# Patient Record
Sex: FEMALE | Race: ASIAN | NOT HISPANIC OR LATINO | Employment: FULL TIME | URBAN - METROPOLITAN AREA
[De-identification: names, ages, dates, MRNs, and addresses within clinical notes are randomized per-mention and may not be internally consistent; named-entity substitution may affect disease eponyms.]

---

## 2022-11-10 VITALS
WEIGHT: 127.65 LBS | DIASTOLIC BLOOD PRESSURE: 102 MMHG | TEMPERATURE: 98.2 F | HEART RATE: 107 BPM | SYSTOLIC BLOOD PRESSURE: 144 MMHG | OXYGEN SATURATION: 98 % | RESPIRATION RATE: 16 BRPM

## 2022-11-11 ENCOUNTER — HOSPITAL ENCOUNTER (EMERGENCY)
Facility: CLINIC | Age: 47
Discharge: LEFT BEFORE DISCHARGE | End: 2022-11-11
Payer: COMMERCIAL

## 2022-11-11 VITALS
DIASTOLIC BLOOD PRESSURE: 90 MMHG | OXYGEN SATURATION: 99 % | TEMPERATURE: 97.8 F | HEART RATE: 105 BPM | RESPIRATION RATE: 18 BRPM | SYSTOLIC BLOOD PRESSURE: 124 MMHG

## 2022-11-11 NOTE — ED TRIAGE NOTES
Patient has a rectal abscess that she noted yesterday. Patient was here last night but ended up going home because the wait was to long, patient went to urgent care this morning and was told to come to the ED.      Triage Assessment     Row Name 11/11/22 1227       Triage Assessment (Adult)    Airway WDL WDL       Respiratory WDL    Respiratory WDL WDL       Skin Circulation/Temperature WDL    Skin Circulation/Temperature WDL WDL       Cardiac WDL    Cardiac WDL WDL       Peripheral/Neurovascular WDL    Peripheral Neurovascular WDL WDL       Cognitive/Neuro/Behavioral WDL    Cognitive/Neuro/Behavioral WDL WDL

## 2022-11-11 NOTE — ED TRIAGE NOTES
Has hemmorroid surgery scheduled 12/27. Now having rectal abscess. Pt having chills today. Pt tearful in triage.

## 2024-12-10 ENCOUNTER — TELEPHONE (OUTPATIENT)
Dept: FAMILY MEDICINE | Facility: CLINIC | Age: 49
End: 2024-12-10
Payer: COMMERCIAL

## 2024-12-10 ENCOUNTER — HOSPITAL ENCOUNTER (OUTPATIENT)
Facility: CLINIC | Age: 49
Setting detail: OBSERVATION
Discharge: HOME | End: 2024-12-11
Attending: INTERNAL MEDICINE | Admitting: STUDENT IN AN ORGANIZED HEALTH CARE EDUCATION/TRAINING PROGRAM
Payer: COMMERCIAL

## 2024-12-10 DIAGNOSIS — K61.0 PERIANAL ABSCESS: ICD-10-CM

## 2024-12-10 DIAGNOSIS — K61.39 ISCHIORECTAL ABSCESS: Primary | ICD-10-CM

## 2024-12-10 LAB
EST. AVERAGE GLUCOSE BLD GHB EST-MCNC: 151 MG/DL
GLUCOSE BLDC GLUCOMTR-MCNC: 115 MG/DL (ref 70–99)
HBA1C MFR BLD: 6.9 %

## 2024-12-10 PROCEDURE — 83036 HEMOGLOBIN GLYCOSYLATED A1C: CPT | Performed by: INTERNAL MEDICINE

## 2024-12-10 PROCEDURE — 36415 COLL VENOUS BLD VENIPUNCTURE: CPT | Performed by: INTERNAL MEDICINE

## 2024-12-10 PROCEDURE — 99222 1ST HOSP IP/OBS MODERATE 55: CPT | Performed by: INTERNAL MEDICINE

## 2024-12-10 PROCEDURE — 96374 THER/PROPH/DIAG INJ IV PUSH: CPT

## 2024-12-10 PROCEDURE — 120N000001 HC R&B MED SURG/OB

## 2024-12-10 PROCEDURE — 250N000011 HC RX IP 250 OP 636: Performed by: INTERNAL MEDICINE

## 2024-12-10 PROCEDURE — G0379 DIRECT REFER HOSPITAL OBSERV: HCPCS

## 2024-12-10 PROCEDURE — 96375 TX/PRO/DX INJ NEW DRUG ADDON: CPT

## 2024-12-10 PROCEDURE — 250N000013 HC RX MED GY IP 250 OP 250 PS 637: Performed by: INTERNAL MEDICINE

## 2024-12-10 RX ORDER — HYDROMORPHONE HCL IN WATER/PF 6 MG/30 ML
0.2 PATIENT CONTROLLED ANALGESIA SYRINGE INTRAVENOUS
Status: DISCONTINUED | OUTPATIENT
Start: 2024-12-10 | End: 2024-12-11

## 2024-12-10 RX ORDER — NALOXONE HYDROCHLORIDE 0.4 MG/ML
0.4 INJECTION, SOLUTION INTRAMUSCULAR; INTRAVENOUS; SUBCUTANEOUS
Status: DISCONTINUED | OUTPATIENT
Start: 2024-12-10 | End: 2024-12-11 | Stop reason: HOSPADM

## 2024-12-10 RX ORDER — NALOXONE HYDROCHLORIDE 0.4 MG/ML
0.2 INJECTION, SOLUTION INTRAMUSCULAR; INTRAVENOUS; SUBCUTANEOUS
Status: DISCONTINUED | OUTPATIENT
Start: 2024-12-10 | End: 2024-12-11 | Stop reason: HOSPADM

## 2024-12-10 RX ORDER — PIPERACILLIN SODIUM, TAZOBACTAM SODIUM 3; .375 G/15ML; G/15ML
3.38 INJECTION, POWDER, LYOPHILIZED, FOR SOLUTION INTRAVENOUS EVERY 6 HOURS
Status: DISCONTINUED | OUTPATIENT
Start: 2024-12-10 | End: 2024-12-11

## 2024-12-10 RX ORDER — LIDOCAINE 40 MG/G
CREAM TOPICAL
Status: DISCONTINUED | OUTPATIENT
Start: 2024-12-10 | End: 2024-12-11 | Stop reason: HOSPADM

## 2024-12-10 RX ORDER — HYDROMORPHONE HCL IN WATER/PF 6 MG/30 ML
0.4 PATIENT CONTROLLED ANALGESIA SYRINGE INTRAVENOUS
Status: DISCONTINUED | OUTPATIENT
Start: 2024-12-10 | End: 2024-12-11

## 2024-12-10 RX ORDER — PROCHLORPERAZINE MALEATE 5 MG/1
10 TABLET ORAL EVERY 6 HOURS PRN
Status: DISCONTINUED | OUTPATIENT
Start: 2024-12-10 | End: 2024-12-11 | Stop reason: HOSPADM

## 2024-12-10 RX ORDER — AMOXICILLIN 250 MG
1 CAPSULE ORAL 2 TIMES DAILY PRN
Status: DISCONTINUED | OUTPATIENT
Start: 2024-12-10 | End: 2024-12-11 | Stop reason: HOSPADM

## 2024-12-10 RX ORDER — OXYCODONE HYDROCHLORIDE 5 MG/1
5 TABLET ORAL EVERY 4 HOURS PRN
Status: DISCONTINUED | OUTPATIENT
Start: 2024-12-10 | End: 2024-12-11 | Stop reason: HOSPADM

## 2024-12-10 RX ORDER — ACETAMINOPHEN 325 MG/1
650 TABLET ORAL EVERY 4 HOURS PRN
Status: DISCONTINUED | OUTPATIENT
Start: 2024-12-10 | End: 2024-12-11 | Stop reason: HOSPADM

## 2024-12-10 RX ORDER — NICOTINE POLACRILEX 4 MG
15-30 LOZENGE BUCCAL
Status: DISCONTINUED | OUTPATIENT
Start: 2024-12-10 | End: 2024-12-11 | Stop reason: HOSPADM

## 2024-12-10 RX ORDER — ACETAMINOPHEN 650 MG/1
650 SUPPOSITORY RECTAL EVERY 4 HOURS PRN
Status: DISCONTINUED | OUTPATIENT
Start: 2024-12-10 | End: 2024-12-11 | Stop reason: HOSPADM

## 2024-12-10 RX ORDER — ONDANSETRON 4 MG/1
4 TABLET, ORALLY DISINTEGRATING ORAL EVERY 6 HOURS PRN
Status: DISCONTINUED | OUTPATIENT
Start: 2024-12-10 | End: 2024-12-11 | Stop reason: HOSPADM

## 2024-12-10 RX ORDER — DEXTROSE MONOHYDRATE 25 G/50ML
25-50 INJECTION, SOLUTION INTRAVENOUS
Status: DISCONTINUED | OUTPATIENT
Start: 2024-12-10 | End: 2024-12-11 | Stop reason: HOSPADM

## 2024-12-10 RX ORDER — METFORMIN HYDROCHLORIDE 500 MG/1
500 TABLET, EXTENDED RELEASE ORAL
COMMUNITY

## 2024-12-10 RX ORDER — HYDROMORPHONE HYDROCHLORIDE 1 MG/ML
0.5 INJECTION, SOLUTION INTRAMUSCULAR; INTRAVENOUS; SUBCUTANEOUS ONCE
Status: COMPLETED | OUTPATIENT
Start: 2024-12-10 | End: 2024-12-10

## 2024-12-10 RX ORDER — HYDROXYZINE HYDROCHLORIDE 25 MG/1
25 TABLET, FILM COATED ORAL EVERY 4 HOURS PRN
Status: DISCONTINUED | OUTPATIENT
Start: 2024-12-10 | End: 2024-12-11 | Stop reason: HOSPADM

## 2024-12-10 RX ORDER — AMOXICILLIN 250 MG
2 CAPSULE ORAL 2 TIMES DAILY PRN
Status: DISCONTINUED | OUTPATIENT
Start: 2024-12-10 | End: 2024-12-11 | Stop reason: HOSPADM

## 2024-12-10 RX ORDER — ATORVASTATIN CALCIUM 20 MG/1
20 TABLET, FILM COATED ORAL EVERY EVENING
COMMUNITY

## 2024-12-10 RX ORDER — ONDANSETRON 2 MG/ML
4 INJECTION INTRAMUSCULAR; INTRAVENOUS EVERY 6 HOURS PRN
Status: DISCONTINUED | OUTPATIENT
Start: 2024-12-10 | End: 2024-12-11 | Stop reason: HOSPADM

## 2024-12-10 RX ORDER — CALCIUM CARBONATE 500 MG/1
1000 TABLET, CHEWABLE ORAL 4 TIMES DAILY PRN
Status: DISCONTINUED | OUTPATIENT
Start: 2024-12-10 | End: 2024-12-11 | Stop reason: HOSPADM

## 2024-12-10 RX ADMIN — HYDROMORPHONE HYDROCHLORIDE 0.5 MG: 1 INJECTION, SOLUTION INTRAMUSCULAR; INTRAVENOUS; SUBCUTANEOUS at 20:50

## 2024-12-10 RX ADMIN — HYDROXYZINE HYDROCHLORIDE 25 MG: 25 TABLET, FILM COATED ORAL at 23:03

## 2024-12-10 RX ADMIN — ACETAMINOPHEN 650 MG: 325 TABLET, FILM COATED ORAL at 23:03

## 2024-12-10 RX ADMIN — PIPERACILLIN AND TAZOBACTAM 3.38 G: 3; .375 INJECTION, POWDER, FOR SOLUTION INTRAVENOUS at 22:24

## 2024-12-10 ASSESSMENT — ACTIVITIES OF DAILY LIVING (ADL)
ADLS_ACUITY_SCORE: 17
ADLS_ACUITY_SCORE: 15
ADLS_ACUITY_SCORE: 15
ADLS_ACUITY_SCORE: 41
ADLS_ACUITY_SCORE: 15

## 2024-12-10 NOTE — TELEPHONE ENCOUNTER
Clinic: [facility] urgency room  Dx: Perianal rectal abscess  Provider: Dr. Meka Whittaker  Bed request: Med Surgery    Presented to urgent care with perianal abscess that started yesterday. CT scan showed 2 cm abscess with likely fistula.     Tachy at low 100s, afebrile, 115/70    WBC 12, lactic acid normal    Was given Unasyn, Morphine 4 mg and Dilaudid 0.5 mg.     Did not obtain blood cultures.

## 2024-12-11 VITALS
DIASTOLIC BLOOD PRESSURE: 60 MMHG | HEART RATE: 75 BPM | WEIGHT: 131 LBS | SYSTOLIC BLOOD PRESSURE: 109 MMHG | TEMPERATURE: 98.5 F | BODY MASS INDEX: 21.83 KG/M2 | HEIGHT: 65 IN | RESPIRATION RATE: 20 BRPM | OXYGEN SATURATION: 99 %

## 2024-12-11 PROBLEM — K61.39 ISCHIORECTAL ABSCESS: Status: ACTIVE | Noted: 2024-12-11

## 2024-12-11 LAB
ANION GAP SERPL CALCULATED.3IONS-SCNC: 14 MMOL/L (ref 7–15)
BUN SERPL-MCNC: 10.4 MG/DL (ref 6–20)
CALCIUM SERPL-MCNC: 8.8 MG/DL (ref 8.8–10.4)
CHLORIDE SERPL-SCNC: 106 MMOL/L (ref 98–107)
CREAT SERPL-MCNC: 0.78 MG/DL (ref 0.51–0.95)
EGFRCR SERPLBLD CKD-EPI 2021: >90 ML/MIN/1.73M2
ERYTHROCYTE [DISTWIDTH] IN BLOOD BY AUTOMATED COUNT: 13.4 % (ref 10–15)
GLUCOSE BLDC GLUCOMTR-MCNC: 106 MG/DL (ref 70–99)
GLUCOSE BLDC GLUCOMTR-MCNC: 107 MG/DL (ref 70–99)
GLUCOSE BLDC GLUCOMTR-MCNC: 135 MG/DL (ref 70–99)
GLUCOSE BLDC GLUCOMTR-MCNC: 192 MG/DL (ref 70–99)
GLUCOSE SERPL-MCNC: 106 MG/DL (ref 70–99)
HCO3 SERPL-SCNC: 22 MMOL/L (ref 22–29)
HCT VFR BLD AUTO: 33 % (ref 35–47)
HGB BLD-MCNC: 11 G/DL (ref 11.7–15.7)
MCH RBC QN AUTO: 29.2 PG (ref 26.5–33)
MCHC RBC AUTO-ENTMCNC: 33.3 G/DL (ref 31.5–36.5)
MCV RBC AUTO: 88 FL (ref 78–100)
PLATELET # BLD AUTO: 226 10E3/UL (ref 150–450)
POTASSIUM SERPL-SCNC: 3.8 MMOL/L (ref 3.4–5.3)
RBC # BLD AUTO: 3.77 10E6/UL (ref 3.8–5.2)
SODIUM SERPL-SCNC: 142 MMOL/L (ref 135–145)
WBC # BLD AUTO: 9.6 10E3/UL (ref 4–11)

## 2024-12-11 PROCEDURE — 250N000011 HC RX IP 250 OP 636: Performed by: INTERNAL MEDICINE

## 2024-12-11 PROCEDURE — 250N000013 HC RX MED GY IP 250 OP 250 PS 637: Performed by: STUDENT IN AN ORGANIZED HEALTH CARE EDUCATION/TRAINING PROGRAM

## 2024-12-11 PROCEDURE — 80048 BASIC METABOLIC PNL TOTAL CA: CPT | Performed by: INTERNAL MEDICINE

## 2024-12-11 PROCEDURE — 85014 HEMATOCRIT: CPT | Performed by: INTERNAL MEDICINE

## 2024-12-11 PROCEDURE — 36415 COLL VENOUS BLD VENIPUNCTURE: CPT | Performed by: INTERNAL MEDICINE

## 2024-12-11 PROCEDURE — 96376 TX/PRO/DX INJ SAME DRUG ADON: CPT

## 2024-12-11 PROCEDURE — 250N000012 HC RX MED GY IP 250 OP 636 PS 637: Performed by: INTERNAL MEDICINE

## 2024-12-11 PROCEDURE — 99203 OFFICE O/P NEW LOW 30 MIN: CPT | Performed by: PHYSICIAN ASSISTANT

## 2024-12-11 RX ORDER — OXYCODONE HYDROCHLORIDE 5 MG/1
2.5-5 TABLET ORAL EVERY 4 HOURS PRN
Qty: 6 TABLET | Refills: 0 | Status: SHIPPED | OUTPATIENT
Start: 2024-12-11

## 2024-12-11 RX ADMIN — AMOXICILLIN AND CLAVULANATE POTASSIUM 1 TABLET: 875; 125 TABLET, FILM COATED ORAL at 11:02

## 2024-12-11 RX ADMIN — INSULIN ASPART 2 UNITS: 100 INJECTION, SOLUTION INTRAVENOUS; SUBCUTANEOUS at 00:29

## 2024-12-11 RX ADMIN — PIPERACILLIN AND TAZOBACTAM 3.38 G: 3; .375 INJECTION, POWDER, FOR SOLUTION INTRAVENOUS at 04:12

## 2024-12-11 ASSESSMENT — ACTIVITIES OF DAILY LIVING (ADL)
ADLS_ACUITY_SCORE: 21
ADLS_ACUITY_SCORE: 21
ADLS_ACUITY_SCORE: 17
ADLS_ACUITY_SCORE: 21
ADLS_ACUITY_SCORE: 17
ADLS_ACUITY_SCORE: 21

## 2024-12-11 NOTE — PLAN OF CARE
Discharge Note    Patient discharged to home via private vehicle  accompanied by significant other .  IV: Discontinued  Prescriptions printed and given to patient/family. Pain meds  Belongings reviewed and sent with patient and family.   Home medications returned to patient: NA  Equipment sent with: patient, N/A.   patient and family verbalizes understanding of discharge instructions. AVS given to patient and family.  Additional education completed?  Follow-up appts and care of site of abscess., discharge instructions.  Pt is discharged to home.  Pt is a/o, denies pain, VSS.  Discharge instructions explained to pt and verbally acknowledged.  All valuables with pt at time of discharge.  Pt left with family via automobile.    Lisa Childress RN

## 2024-12-11 NOTE — CONSULTS
[facility]  Colon and Rectal Surgery Consult Note  Name: Essie Gould    MRN: [id number]  YOB: 1975    Age: 49 year old  Date of admission: 12/10/2024  Primary care provider: No Ref-Primary, Physician     Requesting Physician:  Dr. Avalos  Reason for consult:  Perianal abscess            History of Present Illness:   Essie Gould is a 49 year old female with PMH including DM type II, hyperlipidemia, hemorrhoids, and previous perianal abscess, seen at the request of Dr. Avalos, who was directly admitted from the [facility] for a perianal abscess. Patient reports developing anal pain yesterday with associated chills and nausea. Denies any fevers. She presented to the [facility] and a CT pelvis was obtained, which revealed a 2.1 x 1.0 cm perianal abscess with probable fistula to the anus. She had a mild leukocytosis of 12.1, lactate normal. She received a dose of Unasyn at the urgent care. Patient was directly admitted to [facility] for further management. She was started on IV Zosyn and has been NPO since midnight.    Today, patient is resting comfortably in bed. BP is 109/60, HR 75, temperature 98.5 degrees F, and SpO2 is 99% on RA. WBC has improved to 9.6. Patient reports the site spontaneously drained a large amount of brown discharge overnight. Her pain is subsequently much improved. Denies any fevers, blood in her stools, or abdominal pain. Her last BM was yesterday and was formed without blood. She typically has a soft and formed stool once per day. Patient notes a previous abscess about 2 years ago. This spontaneously drained and she has had no issues since then. Of note, patient reports she was scheduled for a hemorrhoidectomy and intraoperative colonoscopy around that time, but she cancelled this due to her abscess. Patient denies any known family history of colorectal cancer, ulcerative colitis, or Crohn's disease.    Patient mentions that she is going on a trip to [facility] at  "the end of next week.    Colonoscopy History:  3/29/2010: normal except internal and external hemorrhoids              Past Medical History:   No past medical history on file.          Past Surgical History:   No past surgical history on file.            Social History:     Social History     Tobacco Use    Smoking status: Never    Smokeless tobacco: Not on file   Substance Use Topics    Alcohol use: Not on file             Family History:   No family history on file.          Allergies:     Allergies   Allergen Reactions    Shellfish-Derived Products Hives             Medications:     Current Facility-Administered Medications   Medication Dose Route Frequency Provider Last Rate Last Admin    insulin aspart (NovoLOG) injection (RAPID ACTING)  1-7 Units Subcutaneous Q4H Anthony Avalos MD   2 Units at 12/11/24 0029    piperacillin-tazobactam (ZOSYN) 3.375 g vial to attach to  mL bag  3.375 g Intravenous Q6H Anthony Avalos MD   3.375 g at 12/11/24 0412    sodium chloride (PF) 0.9% PF flush 3 mL  3 mL Intracatheter Q8H Anthony Avalos MD   3 mL at 12/11/24 0414             Review of Systems:   A comprehensive greater than 10 system review of systems was carried out.  Pertinent positives and negatives are noted above.  Otherwise negative for contributory info.            Physical Exam:     Blood pressure 109/60, pulse 75, temperature 98.5  F (36.9  C), temperature source Oral, resp. rate 20, height 1.651 m (5' 5\"), weight 59.4 kg (131 lb), SpO2 99%.  No intake or output data in the 24 hours ending 12/11/24 0934  EXAM:  GEN: Awake alert and oriented, appears stated age  PULM: Non-labored breathing with normal respiratory effort  RECTAL: Chaperone present. Rectal exam with a soft hemorrhoidal skin tag anteriorly and an inflamed, non-thrombosed external hemorrhoid posteriorly. Posterior to the inflamed hemorrhoid, there is an area of induration and tenderness with a small opening and dried " blood. Gentle pressure was applied without any further drainage of pus. No palpable fluctuance noted. ALIVIA without any palpable masses or fluctuance. No blood on gloved finger.  PSYCH: responsive, alert, cooperative; oriented x3; appropriate mood and affect  EXT/SKIN: inspection reveals no rashes, lesions or ulcers, normal coloring         Data Reviewed:     Results for orders placed or performed in visit on 06/04/09   ECHO HEART XTHORACIC,COMPLETE, W/O DOPPLER     Value    XCELERA         Interpretation Summary  The study was technically adequate.  There is no comparison study available.  Normal transthoracic echocardiogram.  The visual ejection fraction is estimated at 55-60%.  PatientHeight: 65 in  PatientWeight: 120 lbs  HeartRate: 67 bpm  BSA 1.6 m^2        Left Ventricle  The left ventricle is normal in structure, function and size.  The visual ejection fraction is estimated at 55-60%.  Normal left ventricular diastolic function.  Normal left ventricular wall motion.     Right Ventricle  The right ventricle is normal in structure, function and size.     Atria  Normal left atrial size.  Right atrial size is normal.  There is no atrial shunt seen.     Mitral Valve  The mitral valve is normal in structure and function.  There is trace mitral regurgitation.     Tricuspid Valve  The tricuspid valve is normal in structure and function.  There is trace tricuspid regurgitation.  The right ventricular systolic pressure is approximated at 13 mmHg plus the   right atrial pressure.  Small IVC (<1.5cm) with normal respiratory collapse; right atrial pressure is   estimated at 0-5mmHg.     Aortic Valve  The aortic valve is normal in structure and function.  No aortic regurgitation is present.     Pulmonic Valve  Normal pulmonic valve.  There is trace pulmonic valvular regurgitation.     Vessels  Normal size aorta.     Pericardium  There is no pericardial effusion.     Rhythm  The rhythm was normal sinus.    "  Procedure  Complete Echo Adult.     MMode 2D Measurements & Calculations  IVSd: 0.77 cm  IVSs: 1.3 cm  LVIDd: 3.7 cm  LVIDs: 2.5 cm  LVPWd: 0.77 cm  LVPWs: 1.2 cm  FS: 33 %  LV mass(C)d: 78 grams  EPSS: 0.72 cm  Ao root diam: 2.4 cm  LA dimension: 2.5 cm  LA/Ao: 1.0   Doppler Measurements & Calculations  MV E point: 89 cm/sec  MV A point: 53 cm/sec  MV E/A: 1.7   MV dec time: 0.22 sec  TR Max P mmHg     Interpreting Physician:  Rogelio Garcia MD electronically signed on 2009   08:57:54       Recent Labs   Lab 24  0804   WBC 9.6   HGB 11.0*   HCT 33.0*   MCV 88        Recent Labs   Lab 24  0804 24  0744 24  0414     --   --    POTASSIUM 3.8  --   --    CHLORIDE 106  --   --    CO2 22  --   --    ANIONGAP 14  --   --    * 107* 106*   BUN 10.4  --   --    CR 0.78  --   --    GFRESTIMATED >90  --   --    HELEN 8.8  --   --      GFR Estimate   Date Value Ref Range Status   2024 >90 >60 mL/min/1.73m2 Final     Comment:     eGFR calculated using  CKD-EPI equation.   2009 >90 >60 mL/min/1.7m2 Final     GFR Estimate If Black   Date Value Ref Range Status   2009 >90 >60 mL/min/1.7m2 Final     Recent Labs   Lab 24  0804   HGB 11.0*     No results for input(s): \"AST\", \"ALT\", \"GGT\", \"ALKPHOS\", \"BILITOTAL\", \"BILICONJ\", \"BILIDIRECT\", \"GILDA\" in the last 168 hours.    Invalid input(s): \"BILIRUBININDIRECT\"  No results for input(s): \"INR\" in the last 168 hours.  No results for input(s): \"LACT\" in the last 168 hours.      Assessment and Plan:   Essie Gould is a 49 year old female with PMH including DM type II, hyperlipidemia, hemorrhoids, and previous perianal abscess, seen at the request of Dr. Avalos, who was directly admitted from the [facility] for a perianal abscess. CT pelvis was performed at Urgent Care, which demonstrated a 2.1 x 1.0 cm perianal abscess with probable fistula to the anus. She had a mild leukocytosis of 12.1 which is now " normalized this morning. Patient is afebrile and all other vital signs are within normal limits. Patient describes a large amount of brown pus that drained early this morning prior to exam. Rectal exam today reveals a soft hemorrhoidal skin tag anteriorly and an inflamed, non-thrombosed external hemorrhoid posteriorly. Posterior to the inflamed hemorrhoid, there is a small opening without any surrounding fluctuance noted. Gentle pressure was applied without any further drainage of pus. Patient denies any further pain when resting in bed, but the area is  to palpation. Discussed perianal abscesses and fistulas. Explained the possible treatment options including bedside drainage versus an exam under anesthesia with drainage and possible seton placement. If a seton was placed, this would likely require an additional surgery to remove the seton and perform a fistulotomy versus a ligation of the fistula tract. We also discussed holding off on any procedures at this time as she has had significant drainage and her symptoms have improved. Reviewed that with the presence of a fistula, there is a risk of developing a recurrent abscess. Patient wished to hold off on surgery at this time and follow up in clinic to discuss elective surgery. Our office will reach out to her to schedule a wound check early next week given that she is planning on leaving for an international trip at the end of next week.     As no urgent surgery is planned during this hospitalization, okay for patient to have a regular diet. Patient is at higher risk due to her diabetes and should receive a total of 7-10 days of antibiotics. Patient should continue to keep the area clean and dry. Recommend warm water soaks 1-2 times per day to promote drainage and to provide relief. She can keep a dry piece of gauze at the site to absorb drainage. If she has increased pain, swelling, redness, warmth, fevers, or chills, patient should present to the ED  or call our office for immediate evaluation. Patient verbalized understanding and agreed with the plan.      Plan:  Admit to hospitalist  Surgery: No emergent surgery indicated.  Diet: Okay for regular diet  IV Fluids: per hospitalist  Antibiotics:  IV Zosyn, transition to oral Augmentin or Cipro/Flagyl on discharge  Medications: Continue home meds per hospitalist  I&O s:  strict I&O s  Labs:   - Reviewed: by myself and Dr. Bland  - Ordered:   Imaging:   - CT pelvis report was reviewed. Images unavailable and need to be uploaded to  PACS.  Activity: ambulate as tolerated, encourage OOB  DVT prophylaxis: SCD s  This plan has been discussed with Dr. Bland    Patient specific identified risk factors considered as part of today s evaluation include: previous perianal abscess, T2DM    Additional history obtained from chart review and patient.  Time spent on consultation including non face-to-face time: 46          Albert Angel PA-C  [facility]  Phone:  [phone]

## 2024-12-11 NOTE — PROGRESS NOTES
12/10/24 0981   Skin   Skin WDL X;all   Skin Color without discoloration   Skin Temperature cool  (feet)   Skin Moisture dry   Skin Elasticity quick return to original state   Skin Integrity other (see comments)   Other (see comments) perianal abcess   Device Skin Interventions Taken No adjustments needed     Admission/Transfer from: urgent care   2 RN skin assessment completed. Yes  Significant findings include: hemorrhoid, rectal abscess  LDA added (if applicable)? NO  Requested WOC Nurse Consult from MD? NO

## 2024-12-11 NOTE — H&P
[facility]    History and Physical - Hospitalist Service       Date of Admission:  12/10/2024    Assessment & Plan      Essie Gould is a 49 year old female with PMH significant for DM type II, hemorrhoid, possible prior perianal fistula, hyperlipidemia was directly transferred from an Urgent care center for perianal abscess and admitted on 12/10/2024.     Perianal abscess with possible fistula  History of hemorrhoid.  -Patient with severe perianal pain.  -Imaging suggestive of a small perianal abscess with possible fistula.  -She has leukocytosis on labs done at urgent care center, lactate is normal.  -No fever recorded.  -Noted blood culture was not done prior to antibiotic.  -Per patient description this likely is there is some drainage ongoing from the abscess.  -Pain control, started on Oxycodone and Dilaudid.  -Started on IV antibiotic Zosyn, she had a dose of Unasyn at the urgent care center.  -Ambulate the patient as tolerated.  -Full liquid diet for now.  -N.p.o. after midnight.  -Colorectal surgery consult for recommendations.  -Check CBC and BMP in the morning.      DM type II  -PTA on metformin, will hold it for now.  -Ordered sliding scale insulin.    Hyperlipidemia: Resume PTA Lipitor when reviewed by pharmacy.    I discussed with patient and with her  at bedside.  All their questions and concerns addressed.  She agreed with the plan of care.        Diet: NPO for Medical/Clinical Reasons Except for: Meds, Ice Chips  Combination Diet Full Liquid; Moderate Consistent Carb (60 g CHO per Meal) Diet    DVT Prophylaxis: Ambulate every shift  Cobos Catheter: Not present  Lines: None     Cardiac Monitoring: None  Code Status: Full Code      Clinically Significant Risk Factors Present on Admission                                        Disposition Plan     Medically Ready for Discharge: Anticipated in 2-4 Days           Anthony Avalos MD  Hospitalist Service  [facility]  [facility]  Securely message with Tessy (more info)  Text page via [facility] Paging/Directory     ______________________________________________________________________    Chief Complaint   Transferred for direct admission after diagnosed perianal abscess.    History is obtained from the patient and also from transfer signout    History of Present Illness   Essie Gould is a 49 year old female with PMH significant for diabetes mellitus type 2, HLP, hemorrhoid, prior perianal abscess which spontaneously drained by itself who was transferred from [facility] [place] for direct admission with a diagnosis of perianal abscess with possible fistula.  Patient has ongoing perianal pain for the last 3 days which got worse to the extent that she was not able to sit.  She also noted ongoing small draining which did not subside this time.  She has associated severe excruciating pain to her perianal area. She had associated chilling sensation but denied any fevers.  Her last bowel movement was this morning, no nausea or vomiting.  Last meal was around noon time  Urgent care facility work up: WBCs 12.1, hemoglobin 13, platelets 298.  Lactate was 1.7.  BMP showed sodium of 140, potassium 3.9, CO2 24, glucose 168, BUN 7, creatinine 0.7.   CT scan of the pelvis with contrast did not show 2.1 x 1.0 cm perineal abscess with probable fistula to the intrapelvic abscess.  Urgent care treatment: Unasyn and a dose of IV morphine.      Past Medical History    DM type II  Hemorrhoids  Hyperlipidemia    Past Surgical History    section  Hysterectomy with bilateral salpingectomy  Excision of adenomyosis         Review of Systems    The 10 point Review of Systems is negative other than noted in the HPI or here.      Physical Exam   Vital Signs: Temp: 98.4  F (36.9  C) Temp src: Oral BP: 124/69 Pulse: 92   Resp: 14 SpO2: 100 %      Weight: 131 lbs 0 oz    General Appearance: Awake and alert, uncomfortable, seem to be in  pain  Respiratory: Good air entry bilaterally, no wheezing crackles or  Cardiovascular: S1 and S2 regular, no gallop or murmur.  GI: Soft, nontender, positive bowel sound.  Skin: No skin rash on examining exposed area     Medical Decision Making       60 MINUTES SPENT BY ME on the date of service doing chart review, history, exam, documentation & further activities per the note.      Data   Imaging results reviewed over the past 24 hrs:   Recent Results (from the past 24 hours)   CT Pelvis Bone w Contrast    Narrative    For Patients: As a result of the 21st Century Cures Act, medical imaging exams and procedure reports are released immediately into your electronic medical record. You may view this report before your referring provider. If you have questions, please contact your health care provider.    EXAM: CT PELVIS W  LOCATION: [facility] [place]  DATE: 12/10/2024    INDICATION: Perianal abscess or fistula suspected  COMPARISON: 11/11/2022  TECHNIQUE: CT scan of the pelvis was performed with IV contrast. Multiplanar reformats were obtained. Dose reduction techniques were used.  CONTRAST: IOPAMIDOL 300 MG/ML  ML BOTTLE: 100mL    FINDINGS:    PELVIC ORGANS: 2.1 x 1.0 cm perianal abscess with probable fistula to the anus. This is probably in the medial left gluteal fold. No intrapelvic abscess. Trace pelvic free fluid. No dilated bowel or adenopathy.    MUSCULOSKELETAL: No frankly destructive bony lesions.    Impression    1.  Perianal abscess.     No lab results found in last 7 days.

## 2024-12-11 NOTE — PLAN OF CARE
"Pt up SBA, rates perianal pain 8/10 \"burning\". Awaiting orders from MD.       Problem: Adult Inpatient Plan of Care  Goal: Plan of Care Review  Description: The Plan of Care Review/Shift note should be completed every shift.  The Outcome Evaluation is a brief statement about your assessment that the patient is improving, declining, or no change.  This information will be displayed automatically on your shift  note.  Outcome: Progressing  Flowsheets (Taken 12/10/2024 1926)  Outcome Evaluation: pt admitted to [facility]. Oriented to room.  Plan of Care Reviewed With:   patient   spouse  Overall Patient Progress: no change  Goal: Patient-Specific Goal (Individualized)  Description: You can add care plan individualizations to a care plan. Examples of Individualization might be:  \"Parent requests to be called daily at 9am for status\", \"I have a hard time hearing out of my right ear\", or \"Do not touch me to wake me up as it startles  me\".  Outcome: Progressing  Goal: Absence of Hospital-Acquired Illness or Injury  Outcome: Progressing  Goal: Optimal Comfort and Wellbeing  Outcome: Progressing  Intervention: Monitor Pain and Promote Comfort  Recent Flowsheet Documentation  Taken 12/10/2024 1855 by Nicolette Rosario, RN  Pain Management Interventions: (waiting for MD to see pt) MD notified (comment)  Goal: Readiness for Transition of Care  Outcome: Progressing  Intervention: Mutually Develop Transition Plan  Recent Flowsheet Documentation  Taken 12/10/2024 1900 by Nicolette Rosario, RN  Equipment Currently Used at Home: none   Goal Outcome Evaluation:      Plan of Care Reviewed With: patient, spouse    Overall Patient Progress: no changeOverall Patient Progress: no change    Outcome Evaluation: pt admitted to [facility]. Oriented to room.      "

## 2024-12-11 NOTE — PLAN OF CARE
"Pertinent assessments: Pt A&O x 4. Low grade temp, on RA. C/O pain 8/10 IV Dilaudid x 1, Tylenol and Atarax x 1 given, heat applied. Up to void, BG checks 115, 192, 106    Major Shift Events: uneventful     Treatment Plan: IV Zosyn, BG checks q 4hrs, pain management, NPO @ 0000 for colorectal consult     Problem: Adult Inpatient Plan of Care  Goal: Plan of Care Review  Description: The Plan of Care Review/Shift note should be completed every shift.  The Outcome Evaluation is a brief statement about your assessment that the patient is improving, declining, or no change.  This information will be displayed automatically on your shift  note.  Outcome: Not Progressing  Flowsheets (Taken 12/11/2024 0619)  Outcome Evaluation: continues to have pain at site of abscess, not much relief from pain medication, moderate amount of drainage from site  Plan of Care Reviewed With: patient  Overall Patient Progress: no change  Goal: Patient-Specific Goal (Individualized)  Description: You can add care plan individualizations to a care plan. Examples of Individualization might be:  \"Parent requests to be called daily at 9am for status\", \"I have a hard time hearing out of my right ear\", or \"Do not touch me to wake me up as it startles  me\".  Outcome: Not Progressing  Goal: Absence of Hospital-Acquired Illness or Injury  Outcome: Not Progressing  Intervention: Identify and Manage Fall Risk  Recent Flowsheet Documentation  Taken 12/10/2024 2247 by Megan Marcus, RN  Safety Promotion/Fall Prevention:   activity supervised   clutter free environment maintained   lighting adjusted   nonskid shoes/slippers when out of bed   safety round/check completed   treat reversible contributory factors   treat underlying cause  Intervention: Prevent Skin Injury  Recent Flowsheet Documentation  Taken 12/10/2024 2247 by Megan Marcus, RN  Body Position:   position changed independently   supine, head elevated  Intervention: Prevent and Manage VTE " (Venous Thromboembolism) Risk  Recent Flowsheet Documentation  Taken 12/10/2024 2247 by Megan Marcus RN  VTE Prevention/Management:   SCDs on (sequential compression devices)   patient refused intervention  Intervention: Prevent Infection  Recent Flowsheet Documentation  Taken 12/10/2024 2247 by Megan Marcus RN  Infection Prevention:   cohorting utilized   hand hygiene promoted   rest/sleep promoted  Goal: Optimal Comfort and Wellbeing  Outcome: Not Progressing  Goal: Readiness for Transition of Care  Outcome: Not Progressing     Problem: Pain Acute  Goal: Optimal Pain Control and Function  Outcome: Not Progressing  Intervention: Prevent or Manage Pain  Recent Flowsheet Documentation  Taken 12/10/2024 2247 by Megan Marcus RN  Medication Review/Management: medications reviewed   Goal Outcome Evaluation:      Plan of Care Reviewed With: patient    Overall Patient Progress: no changeOverall Patient Progress: no change    Outcome Evaluation: continues to have pain at site of abscess, not much relief from pain medication, moderate amount of drainage from site

## 2024-12-11 NOTE — PHARMACY-ADMISSION MEDICATION HISTORY
Pharmacist Admission Medication History    Admission medication history is complete. The information provided in this note is only as accurate as the sources available at the time of the update.    Information Source(s): Patient via in-person    Pertinent Information: none    Changes made to PTA medication list:  Added: all listed  Deleted: None  Changed: None    Allergies reviewed with patient and updates made in EHR: yes    Medication History Completed By: Kyle Padgett RPH 12/10/2024 8:15 PM    PTA Med List   Medication Sig Last Dose/Taking    atorvastatin (LIPITOR) 20 MG tablet Take 20 mg by mouth every evening. 12/9/2024 Evening    metFORMIN (GLUCOPHAGE XR) 500 MG 24 hr tablet Take 500 mg by mouth daily (with dinner). 12/9/2024 Evening

## 2024-12-17 NOTE — DISCHARGE SUMMARY
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    Clinically Significant Risk Factors     # DMII: A1C = 6.9 % (Ref range: <5.7 %) within past 6 months       Follow-ups Needed After Discharge   Follow-up Appointments       Follow-up and recommended labs and tests       Follow-up with colorectal surgery in the clinic.                Unresulted Labs Ordered in the Past 30 Days of this Admission       No orders found from 11/10/2024 to 12/11/2024.            Discharge Disposition   Discharged to home  Condition at discharge: Stable    Hospital Course   Essie Gould is a 49 year old female with PMH significant for DM type II, hemorrhoid, possible prior perianal fistula, hyperlipidemia was directly transferred from an Urgent care center for perianal abscess and admitted on 12/10/2024.  Patient reported severe perianal pain.  She also reported prior history of perianal abscess about 2 years ago which was managed conservatively.  Hemodynamically stable.  CT imaging showed perianal abscess with probable fistula to anus, size about 2.1 x 1 cm.  Patient was also noted to have mild leukocytosis at 12.1..  Patient was started on IV Zosyn.  Colorectal surgery consulted, appreciated recommendations.  Soon after the admission to the hospital the abscess itself burst open and started draining pus.  Patient's pain improved.  Colorectal decided to hold off any procedure at this time given significant improvement in her symptoms.  Given presence of fistula there is a risk of developing a recurrent abscess.  Patient will be following up with colorectal surgery outpatient and will discuss elective procedure.  IV Zosyn was stopped and patient was transition to p.o. Augmentin.  Patient will be  completing a total 10-day course of antibiotics.  A prescription for oxycodone 6 pills was also provided.    Consultations This Hospital Stay   COLORECTAL SURGERY IP CONSULT    Code Status   Prior    Time Spent on this Encounter   I, Priti Lopez MD, personally saw the patient today and spent greater than 30 minutes discharging this patient.       Priti Lopez MD  [place] [address] [facility]  [address]  [facility] [address]  Phone: [phone]  Fax: [phone]  ______________________________________________________________________    Physical Exam   Vital Signs:                    Weight: 131 lbs 0 oz  Constitutional: awake, alert, cooperative, no apparent distress, and appears stated age  Eyes: Anicteric sclera  ENT: normocephalic, without obvious abnormality  Respiratory: On room air, equal air entry bilaterally, no wheezing or crackles  Cardiovascular: Normal rate, regular rhythm, no murmur  GI: Soft, nontender  Will briefly reviewed  Musculoskeletal: no lower extremity pitting edema present  Neurologic: Awake, alert, oriented to name, place and time.  Disc  Neuropsychiatric: Appropriate mood and affect       Primary Care Physician   Physician No Ref-Primary    Discharge Orders      Reason for your hospital stay    Perianal abscess with fistula, drained itself.       Dear Essie,     You were admitted to the hospital due to perianal abscess.  You were started on IV antibiotics.  You were seen by colorectal surgery.  During the course of hospital stay your abscess burst open by itself and drained pus.  Since you opted for an elective surgical procedure I recommend that you continue taking antibiotics and follow-up with colorectal in the clinic.  If you develop any fever, increasing perianal pain, chills, or any other new symptoms please go to the ER for further evaluation.    It was a pleasure taking care of you.  Good luck!    Priti Lopez MD     Follow-up and recommended labs and tests      Follow-up with colorectal surgery in the clinic.     Activity    Your activity upon discharge: activity as tolerated     Diet    Follow this diet upon discharge: Current Diet:Orders Placed This Encounter      Regular Diet Adult       Significant Results and Procedures   Most Recent 3 CBC's:  Recent Labs   Lab Test 12/11/24  0804   WBC 9.6   HGB 11.0*   MCV 88        Most Recent 3 BMP's:  Recent Labs   Lab Test 12/11/24  1107 12/11/24  0804 12/11/24  0744   NA  --  142  --    POTASSIUM  --  3.8  --    CHLORIDE  --  106  --    CO2  --  22  --    BUN  --  10.4  --    CR  --  0.78  --    ANIONGAP  --  14  --    HELEN  --  8.8  --    * 106* 107*       Discharge Medications   Discharge Medication List as of 12/11/2024 11:33 AM        START taking these medications    Details   amoxicillin-clavulanate (AUGMENTIN) 875-125 MG tablet Take 1 tablet by mouth every 12 hours for 10 days., Disp-20 tablet, R-0, E-Prescribe      oxyCODONE (ROXICODONE) 5 MG tablet Take 0.5-1 tablets (2.5-5 mg) by mouth every 4 hours as needed for severe pain (IF pain not managed with non-pharmacological and non-opioid interventions)., Disp-6 tablet, R-0, Local Print           CONTINUE these medications which have NOT CHANGED    Details   atorvastatin (LIPITOR) 20 MG tablet Take 20 mg by mouth every evening., Historical      metFORMIN (GLUCOPHAGE XR) 500 MG 24 hr tablet Take 500 mg by mouth daily (with dinner)., Historical           Allergies   Allergies   Allergen Reactions    Shellfish-Derived Products Hives

## 2025-01-16 RX ORDER — MULTIVITAMIN
1 TABLET ORAL DAILY
COMMUNITY

## 2025-01-24 ENCOUNTER — HOSPITAL ENCOUNTER (OUTPATIENT)
Facility: CLINIC | Age: 50
Discharge: HOME | End: 2025-01-24
Attending: COLON & RECTAL SURGERY | Admitting: COLON & RECTAL SURGERY
Payer: COMMERCIAL

## 2025-01-24 VITALS
RESPIRATION RATE: 12 BRPM | BODY MASS INDEX: 21.43 KG/M2 | WEIGHT: 128.6 LBS | TEMPERATURE: 97.3 F | DIASTOLIC BLOOD PRESSURE: 73 MMHG | OXYGEN SATURATION: 100 % | HEIGHT: 65 IN | SYSTOLIC BLOOD PRESSURE: 128 MMHG | HEART RATE: 87 BPM

## 2025-01-24 DIAGNOSIS — K61.39 ISCHIORECTAL ABSCESS: Primary | ICD-10-CM

## 2025-01-24 DIAGNOSIS — K60.30 ANAL FISTULA: ICD-10-CM

## 2025-01-24 LAB
COLONOSCOPY: NORMAL
GLUCOSE BLDC GLUCOMTR-MCNC: 128 MG/DL (ref 70–99)
GLUCOSE BLDC GLUCOMTR-MCNC: 144 MG/DL (ref 70–99)

## 2025-01-24 PROCEDURE — 999N000141 HC STATISTIC PRE-PROCEDURE NURSING ASSESSMENT: Performed by: COLON & RECTAL SURGERY

## 2025-01-24 PROCEDURE — 272N000001 HC OR GENERAL SUPPLY STERILE: Performed by: COLON & RECTAL SURGERY

## 2025-01-24 PROCEDURE — 360N000075 HC SURGERY LEVEL 2, PER MIN: Performed by: COLON & RECTAL SURGERY

## 2025-01-24 PROCEDURE — 250N000009 HC RX 250: Performed by: COLON & RECTAL SURGERY

## 2025-01-24 PROCEDURE — 710N000012 HC RECOVERY PHASE 2, PER MINUTE: Performed by: COLON & RECTAL SURGERY

## 2025-01-24 PROCEDURE — 370N000017 HC ANESTHESIA TECHNICAL FEE, PER MIN: Performed by: COLON & RECTAL SURGERY

## 2025-01-24 PROCEDURE — 82962 GLUCOSE BLOOD TEST: CPT

## 2025-01-24 RX ORDER — ONDANSETRON 2 MG/ML
4 INJECTION INTRAMUSCULAR; INTRAVENOUS EVERY 30 MIN PRN
Status: DISCONTINUED | OUTPATIENT
Start: 2025-01-24 | End: 2025-01-24 | Stop reason: HOSPADM

## 2025-01-24 RX ORDER — FENTANYL CITRATE 50 UG/ML
25 INJECTION, SOLUTION INTRAMUSCULAR; INTRAVENOUS
Status: DISCONTINUED | OUTPATIENT
Start: 2025-01-24 | End: 2025-01-24 | Stop reason: HOSPADM

## 2025-01-24 RX ORDER — BUPIVACAINE HYDROCHLORIDE AND EPINEPHRINE 2.5; 5 MG/ML; UG/ML
INJECTION, SOLUTION EPIDURAL; INFILTRATION; INTRACAUDAL; PERINEURAL PRN
Status: DISCONTINUED | OUTPATIENT
Start: 2025-01-24 | End: 2025-01-24 | Stop reason: HOSPADM

## 2025-01-24 RX ORDER — DEXAMETHASONE SODIUM PHOSPHATE 4 MG/ML
4 INJECTION, SOLUTION INTRA-ARTICULAR; INTRALESIONAL; INTRAMUSCULAR; INTRAVENOUS; SOFT TISSUE
Status: DISCONTINUED | OUTPATIENT
Start: 2025-01-24 | End: 2025-01-24 | Stop reason: HOSPADM

## 2025-01-24 RX ORDER — OXYCODONE HYDROCHLORIDE 5 MG/1
5 TABLET ORAL EVERY 4 HOURS PRN
Status: DISCONTINUED | OUTPATIENT
Start: 2025-01-24 | End: 2025-01-24 | Stop reason: HOSPADM

## 2025-01-24 RX ORDER — NALOXONE HYDROCHLORIDE 0.4 MG/ML
0.1 INJECTION, SOLUTION INTRAMUSCULAR; INTRAVENOUS; SUBCUTANEOUS
Status: DISCONTINUED | OUTPATIENT
Start: 2025-01-24 | End: 2025-01-24 | Stop reason: HOSPADM

## 2025-01-24 RX ORDER — SODIUM CHLORIDE, SODIUM LACTATE, POTASSIUM CHLORIDE, CALCIUM CHLORIDE 600; 310; 30; 20 MG/100ML; MG/100ML; MG/100ML; MG/100ML
INJECTION, SOLUTION INTRAVENOUS CONTINUOUS
Status: DISCONTINUED | OUTPATIENT
Start: 2025-01-24 | End: 2025-01-24 | Stop reason: HOSPADM

## 2025-01-24 RX ORDER — ONDANSETRON 4 MG/1
4 TABLET, ORALLY DISINTEGRATING ORAL EVERY 30 MIN PRN
Status: DISCONTINUED | OUTPATIENT
Start: 2025-01-24 | End: 2025-01-24 | Stop reason: HOSPADM

## 2025-01-24 RX ORDER — LIDOCAINE 40 MG/G
CREAM TOPICAL
Status: DISCONTINUED | OUTPATIENT
Start: 2025-01-24 | End: 2025-01-24 | Stop reason: HOSPADM

## 2025-01-24 RX ORDER — OXYCODONE HYDROCHLORIDE 5 MG/1
10 TABLET ORAL EVERY 4 HOURS PRN
Status: DISCONTINUED | OUTPATIENT
Start: 2025-01-24 | End: 2025-01-24 | Stop reason: HOSPADM

## 2025-01-24 RX ORDER — OXYCODONE HYDROCHLORIDE 5 MG/1
5 TABLET ORAL
Status: DISCONTINUED | OUTPATIENT
Start: 2025-01-24 | End: 2025-01-24 | Stop reason: HOSPADM

## 2025-01-24 RX ORDER — OXYCODONE HYDROCHLORIDE 5 MG/1
5 TABLET ORAL EVERY 6 HOURS PRN
Qty: 6 TABLET | Refills: 0 | Status: SHIPPED | OUTPATIENT
Start: 2025-01-24

## 2025-01-24 RX ORDER — MAGNESIUM HYDROXIDE 1200 MG/15ML
LIQUID ORAL PRN
Status: DISCONTINUED | OUTPATIENT
Start: 2025-01-24 | End: 2025-01-24 | Stop reason: HOSPADM

## 2025-01-24 RX ORDER — ACETAMINOPHEN 325 MG/1
975 TABLET ORAL
Status: DISCONTINUED | OUTPATIENT
Start: 2025-01-24 | End: 2025-01-24 | Stop reason: HOSPADM

## 2025-01-24 ASSESSMENT — ACTIVITIES OF DAILY LIVING (ADL)
ADLS_ACUITY_SCORE: 48
ADLS_ACUITY_SCORE: 46

## 2025-01-24 NOTE — DISCHARGE INSTRUCTIONS
DR. CAROLINA BARON M.D.  CLINIC PHONE NUMBER:  [phone]  [facility]     COLONOSCOPY DISCHARGE INSTRUCTIONS    You may not drive, use heavy equipment or consume alcohol for 24 hours because the drugs you were given may cause dizziness, drowsiness, forgetfulness and slower reaction time.    You may resume your regular diet and medications.    If you had a biopsy or polypectomy done, do not take aspirin, aleve (naproxen) or ibuprofen products for the next 10 days.  Tylenol (acetaminophen) is safe to take.    What to watch for:    Problems rarely occur after the exam.  It is important for you to be aware of the early signs of a possible complication.  Call your doctor immediately if you notice any of the followin.  Unusual or persistent abdominal pain (pain that does not move around like a gas pain).    2.  Passing bright red blood from your rectum.    3.  Black or bloody stools.    4.  Temperature above 100.6 degrees F (37.5 degrees C)    If you feel this has become a medical emergency please call 911.

## 2025-01-24 NOTE — OP NOTE
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  This this spontaneously drained and she declined surgical intervention.  She returned to the office and was found to have ongoing drainage suggestive of a fistula.  She had not previously had a colonoscopy and was recommended for an intraoperative colonoscopy with an exam under anesthesia and likely fistulotomy, possible seton.  She was briefed on the risks of bleeding, perforation, missed lesion, as well as risks associated with a fistulotomy or seton including but not limited to alteration of bowel control, bleeding, need for further procedures, and unexpected pathology.  She understood and wished to proceed.     FINDINGS: On colonoscopy she had diverticulum in the descending and sigmoid colon.  No other lesions were noted on direct and retroflexed view.  The exam under anesthesia was notable for an external opening in the posterior midline as well as an internal opening in the distal anal canal.  Fistula probe was easily guided from the internal opening externally and there was a superficial fistula encompassing a very small component of the internal sphincter.  No abscesses or other lesions were noted.  A fistulotomy was performed.     DESCRIPTION OF PROCEDURE: After informed consent was obtained patient was taken the operating room positioned on  the cart left lateral decubitus position.  She was sedated.  Appropriate timeout was undertaken and perianal and rectal exam were performed revealing the above findings.  Colonoscope was advanced in through the anal opening traversed the cecum without difficulty.  The terminal ileum was intubated about 8 cm and was normal without evidence of inflammatory bowel disease or other pathology.  Circumferential evaluation through good preparation did not reveal any other abnormalities other than scattered small and large diverticulum without evidence of inflammation or infection.  Her retroflex was also normal without obvious inflammatory disease, or obvious internal opening.    She was then positioned on the operating table in the prone jackknife position.  Perianal region was taped prepped and draped in usual fashion.  Repeat timeout was undertaken.  We began by injecting the above-mentioned local as a pudendal block on each side.  External inspection did reveal an external opening with gentle pressure there was small amount of purulent discharge.  Digital rectal exam revealed slight irregularity posteriorly but no evidence of stenosis or masses.  Castro bivalve was inserted and there was what appeared to be an internal opening in the posterior midline.  A small Hill-Hadley was inserted and a right angled fistula probe was guided in through the internal opening which easily came out through the external opening as expected.  Palpation revealed very little muscle involved.  The fistula tract was laid open as a fistulotomy over the fistula probe revealing an epithelialized track without obvious inflammation.  There was a small cavity just underneath the skin that was curetted free of debris and point cautery was used to control bleeding that was minimal.  There is a skin tag that was overlying this area that was about 3 mm and this was removed sharply.    After ensuring excellent hemostasis dry gauze dressing was placed  externally and she was returned to the [facility].  Sponge needle and instrument counts were correct at the end the case.    Estimated blood loss less than 5 cc     COMPLICATIONS: No immediate complication    SPECIMENS: None     Porsha Bland MD

## 2025-01-24 NOTE — BRIEF OP NOTE
[facility]    Brief Operative Note    Pre-operative diagnosis: Anal fistula [K60.30]  Post-operative diagnosis Same as pre-operative diagnosis    Procedure: Intraoperative colonoscopy,, N/A - Rectum  exam under anesthesia of the rectum,, N/A - Rectum  Fistulotomy, N/A - Anus    Surgeon: Surgeons and Role:     * Porsha Bland MD - Primary  Anesthesia: MAC   Estimated Blood Loss: None    Drains: None  Specimens: * No specimens in log *  Findings:   Diverticula, otherwise normal colonoscopy and posterior fistula.  Complications: None.  Implants: * No implants in log *

## 2025-03-29 ENCOUNTER — HEALTH MAINTENANCE LETTER (OUTPATIENT)
Age: 50
End: 2025-03-29

## 2025-05-31 ENCOUNTER — MEDICAL CORRESPONDENCE (OUTPATIENT)
Dept: HEALTH INFORMATION MANAGEMENT | Facility: CLINIC | Age: 50
End: 2025-05-31
Payer: COMMERCIAL

## 2025-06-05 ENCOUNTER — TRANSCRIBE ORDERS (OUTPATIENT)
Dept: OTHER | Age: 50
End: 2025-06-05

## 2025-06-05 DIAGNOSIS — E13.9 MODY (MATURITY ONSET DIABETES MELLITUS IN YOUNG) (H): Primary | ICD-10-CM

## 2025-06-21 ENCOUNTER — HEALTH MAINTENANCE LETTER (OUTPATIENT)
Age: 50
End: 2025-06-21

## 2025-07-08 ENCOUNTER — VIRTUAL VISIT (OUTPATIENT)
Dept: CONSULT | Facility: CLINIC | Age: 50
End: 2025-07-08
Attending: INTERNAL MEDICINE
Payer: COMMERCIAL

## 2025-07-08 DIAGNOSIS — Z71.83 ENCOUNTER FOR NONPROCREATIVE GENETIC COUNSELING AND TESTING: ICD-10-CM

## 2025-07-08 DIAGNOSIS — Z13.71 ENCOUNTER FOR NONPROCREATIVE GENETIC COUNSELING AND TESTING: ICD-10-CM

## 2025-07-08 DIAGNOSIS — Z83.3 FAMILY HISTORY OF DIABETES MELLITUS: ICD-10-CM

## 2025-07-08 DIAGNOSIS — E11.9 DIABETES MELLITUS (H): Primary | ICD-10-CM

## 2025-07-08 PROCEDURE — 96041 GENETIC COUNSELING SVC EA 30: CPT | Mod: GT,95 | Performed by: GENETIC COUNSELOR, MS

## 2025-07-08 NOTE — PROGRESS NOTES
[facility]  [address]  EXPLORE CLINIC  [address]  [facility] [address]  Phone: [phone]  Fax: [phone]    Patient:  Essie Gould, Date of birth 1975  Date of Visit:  07/08/2025  Referring Provider Prudencio Brody    Assessment & Plan    Essie has both a personal and family history of diabetes for which her Endocrinologist has recommended that she be evaluated for monogenic causes of diabetes.     1. Essie expressed verbal consent to move forward with genetic testing ([facility] Maturity Onset Diabetes of the Young Panel). Prior authorization (PA) will be initiated. A sample will need to be collected pending test approval. The family is aware that they will need schedule this blood work at an [facility] lab for testing to proceed.  2. We will contact [facility] with the results of PA when they are available. She was informed that this process can take many weeks to complete.   3. I will call [facility] with the results of genetic testing when they are available. The expected turn around time is ~4-6 weeks after sample collection and insurance approval. I will not leave the results of genetic testing via voicemail, regardless of outcome (positive/abnormal or negative/normal).  4. My contact information was provided to [facility] in the event that they have additional questions in the interim.    Elinor Cabrera MS [facility]  Genetic Counselor  Email: [email or web address]  Phone: [phone]    70 minutes spent on the date of the encounter in chart review, patient visit, test coordination/review, documentation, and/or discussion with other providers about the issues documented above        Virtual Visit Details    Type of service:  Video Visit     Originating Location (pt. Location): Home    Distant Location (provider location):  Off-site  Platform used for Video Visit: LoboWell        Genetic Counseling  Consultation Note    Presenting Information:  A consultation in the [facility] was requested for Essie, a 49 year old female, for evaluation of monogenic causes of diabetes. This consultation was requested by Prudencio Brody MD at [facility].    Essie attended this visit conducted by video alone.     History is obtained from Essie and the medical record. I met with the family to obtain a personal and family history, discuss possible genetic contributions to her symptoms, and to obtain informed consent for genetic testing.    Personal History:   Essie reports a longstanding diagnosis of diabetes. [place] 21 y/[place] and reports that her HbA1c has never been good since it was first checked in the [place]. When Essie was 31 y/o Essie was pregnant with her first child and was diagnosed with gestational diabetes which was never in good control despite significant insulin administration. Her Endocrinologist has noted clinical insulin resistance as evidenced by hypertriglyceridemia. They also note fairly optimal A1c readings between 5.8 and 6.9% on low-dose metformin 500 mg daily. Essie reports that she is quite fit and has never had issues with being overweight.    I am not able to locate diabetes autoantibodies (DEVIN-65, IA-2A, ZnT8) labs or C-peptide measurements in Essie's record.     Patient Active Problem List   Diagnosis    Perianal abscess    Ischiorectal abscess     Past Medical History:   Diagnosis Date    Diabetes (H)      Previous Genetic Testing  Essie has never had genetic testing.    Family History:   A standard three generation pedigree was obtained and is scanned into the medical record.  History pertinent to referral is underlined.  Children:   19 y/o son, history of acanthosis nigricans in childhood, but no formal diagnosis of diabetes  Siblings:   Full siblings: Sister with history of longstanding diagnosis of diabetes, though she is slightly overweight per Essie  Paternal:  "  Paternal ancestry is of [place] descent.   Father:  at 42 y/o d/t complications of diabetes. Essie is not sure when her father was formally diagnosed with diabetes, but reports that he had significant complications including neuropathy. He was very thin per Essie  Paternal grandfather:  in mid 60s, cause unknown. He was not reported to have diabetes  Paternal grandmother:  at 59 y/o, cause unknown. History of diabetes   Paternal aunts/uncles:   71 y/o aunt, who has diabetes and is in \"poor health\". Specific details regarding her diabetes diagnosis are not known  Uncle  at 70 y/o, cause unknown. History of diabetes  Uncle  at unknown age for unknown reasons. He had diabetes though limited details regarding his health are not known  Paternal cousins: Numerous, many of whom have diabetes and one who  d/t complications of diabetes at a young age  Maternal:   Maternal ancestry is of [place] descent.  Mother: 76 y/o, alive and well  Maternal grandfather: , cause unknown  Maternal grandmother: , cause unknown, she may have had diabetes which developed later in her life  Maternal aunts/uncles:   Aunt, healthy  Uncle, healthy  Maternal cousins: Numerous, healthy    There are no additional reports of family members with diabetes, renal disease including cysts, autism, developmental delay, intellectual disability, seizures, significant hearing or vision loss, liver cancer, or history of genetic testing/concern for genetic condition. Consanguinity is denied.     Genetic Counseling Discussion:  Maturity-onset diabetes of the young (VARSHA) is a group of several conditions characterized by abnormally high blood sugar levels. These forms of diabetes typically begin before age 30, although they can occur later in life. In VARSHA, elevated blood sugar arises from reduced production of insulin, which is a hormone produced in the pancreas that helps regulate blood sugar levels. This form of diabetes " has differences from type 1 and type 2 diabetes. Specifically, insulin controls how much glucose (a type of sugar) is passed from the blood into cells, where it is used as an energy source. VARSHA is estimated to account for 1 to 3 percent of all cases of diabetes.    The different types of VARSHA are distinguished by their genetic causes. The most common types are URI1P-GVID (also known as MODY3), accounting for 50 to 70 percent of cases, and GCK-VARSHA (MODY2), accounting for 30 to 50 percent of cases. Less frequent types include DCA3J-HPXF (MODY1) and renal cysts and diabetes (RCAD) syndrome (also known as JYT5W-HSPF or MODY5), which each account for 5 to 10 percent of cases. At least ten other types have been identified, and these are very rare.    VARSHA is generally inherited in an autosomal dominant pattern, which means one copy of the altered gene in each cell is sufficient to cause the disorder. It is possible for an individual to be the first person in their family to have VARSHA (de anya variants have been reported).    For more information, refer to: https://medlineplus.gov/genetics/condition/maturity-onset-diabetes-of-the-young/    At this time, we are unable to target genetic testing for a specific condition or gene for Essie.  In situations like this, we recommend examining numerous genes associated with the presenting symptom.  For Essie, we are targeting genes associated with monogenic causes of diabetes. We discussed the details, limitations, and possible outcomes of next generation sequencing gene panels. There are three types of results:  Negative: meaning no pathogenic variants were identified in the genes that were tested  A negative result does not rule out the possibility that Essie's symptoms are due to a genetic etiology  Positive: meaning one (or more) pathogenic variants were identified in the genes that were tested that are associated with Essie's symptoms  We discussed that a positive result  could provide an etiology to Essie's symptoms, give anticipatory guidance as to their potential progression, and clarify risks to family members.  We also discussed that a positive result could indicate that Essie is at risks for other health concerns, outside of their presenting symptoms  Uncertain: meaning one (or more) variants were identified in the genes that were tested, but there is not enough data to know if this variant is disease-causing; these are called variants of uncertain significance (VUS)  In most cases, identification of a VUS does not confirm a diagnosis and does not result in any clinically actionable recommendations.  The variant will be monitored over time to see if more information is known about it in the future.  If a VUS is identified, testing of other relatives may be helpful to provide clarification.    We discussed the potential benefits of genetic testing and why this genetic testing is medically indicated. A positive result will help determine the etiology of diabetes noted in Essie and will guide medical management for Essie.   For example, individuals with GCK VARSHA typically do not require insulin, but individuals with HNF1A (the other most common form of the condition), may require insulin  Low-dose oral sulfonylureas are the first line therapy for individuals with HNF1A and HNF4A VARSHA compared to insulin  Individuals with KCNJ11 and ABCC8 variants can usually be exclusively treated with normal to high-dose oral sulfonylureas rather than insulin injections    If a genetic cause is found for Essie, it will give us a more accurate risk assessment for other family members.  Thus, the recommended testing for Essie  is DIAGNOSTIC testing, and it is NOT investigational.    Resources:  https://www.diabetesgenes.org/    References:  Macie R, Christal Mortensen A, petrona HDZ. Maturity-Onset Diabetes of the Young Overview. 2018 May 24. In: Juan José MOORE, Libra BELL, Sal BROWNING, et al., editors.  Vikram  [Internet]. [place]): [facility], [place]; [phone]. Available from: https://www.ncbi.nlm.nih.gov/books/XUF252626/    Gene List (for MDL orders): APPL1,BLK,TAE,GCK,HNF1A,HNF1B,HNF4A,INS,KCNJ11,KLF11,NEUROD1,PAX4,PDX1,RFX6, ABCC8  Rationale why the test you are ordering is medically appropriate/actionable (for MDL orders):   We discussed the potential benefits of genetic testing and why this genetic testing is medically indicated. A positive result will help determine the etiology of diabetes noted in Essie and will guide medical management for Essie.   For example, individuals with GCK VARSAH typically do not require insulin, but individuals with HNF1A (the other most common form of the condition), may require insulin  Low-dose oral sulfonylureas are the first line therapy for individuals with HNF1A and HNF4A VARSHA compared to insulin  Individuals with KCNJ11 and ABCC8 variants can usually be exclusively treated with normal to high-dose oral sulfonylureas rather than insulin injections

## 2025-07-08 NOTE — Clinical Note
7/8/2025      RE: Essie Gould  [address]  [place] [address]     Dear Colleague,    Thank you for the opportunity to participate in the care of your patient, Essie Gould, at the [facility] at [facility]. Please see a copy of my visit note below.      [facility]  [address]  [address]  [facility] [address]  Phone: [phone]  Fax: [phone]    Patient:  Essie Gould, Date of birth 1975  Date of Visit:  07/08/2025  Referring Provider Prudencio Brody    Assessment & Plan   Essie has both a personal and family history of diabetes for which her Endocrinologist has recommended that she be evaluated for monogenic causes of diabetes.     1. Essie expressed verbal consent to move forward with genetic testing (***genetic test name). Prior authorization (PA) will be initiated. A sample will need to be collected pending test approval. The family is aware that they will need schedule this blood work at an [facility] lab for testing to proceed.  2. We will contact [facility] with the results of PA when they are available. She was informed that this process can take many weeks to complete.   3. I will call [facility] with the results of genetic testing when they are available. The expected turn around time is ~4-6 weeks after sample collection and insurance approval. I will not leave the results of genetic testing via voicemail, regardless of outcome (positive/abnormal or negative/normal).  4. My contact information was provided to [facility] in the event that they have additional questions in the interim.    Elinor Cabrear MS [facility]  Genetic Counselor  Email: [email or web address]  Phone: [phone]    *** minutes spent on the date of the encounter in chart review, patient visit, test coordination/review, documentation, and/or discussion with other  providers about the issues documented above  {Do not delete. Used for tracking note template use:[id number]}      Virtual Visit Details    Type of service:  Video Visit     Originating Location (pt. Location): Home  {PROVIDER LOCATION On-site should be selected for visits conducted from your clinic location or adjoining [facility] hospital, academic office, or other nearby [facility] building. Off-site should be selected for all other provider locations, including home:[id number]}  Distant Location (provider location):  Off-site  Platform used for Video Visit: [employer]        Genetic Counseling Consultation Note    Presenting Information:  A consultation in the [facility] was requested for Essie, a 49 year old female, for evaluation of monogenic causes of diabetes. This consultation was requested by Prudencio Brody MD at [facility].    Essie attended this visit conducted by video alone.     History is obtained from Essie and the medical record. I met with the family to obtain a personal and family history, discuss possible genetic contributions to her symptoms, and to obtain informed consent for genetic testing.    Personal History:   Essie reports a longstanding diagnosis of diabetes. [place] 23 y/o and reports that her HbA1c has never been good since it was first checked in the [place]. When Essie was 29 y/o Essie was pregnant with her first child and was diagnosed with gestational diabetes which was never in good control despite significant insulin administration. Her Endocrinologist has noted clinical insulin resistance as evidenced by hypertriglyceridemia. They also note fairly optimal A1c readings between 5.8 and 6.9% on low-dose metformin 500 mg daily. Essie reports that she is quite fit and has never had issues with being overweight.    I am not able to locate diabetes autoantibodies (DEVIN-65, IA-2A, ZnT8) labs or C-peptide measurements in sEsie's record.     Patient Active Problem List  "  Diagnosis    Perianal abscess    Ischiorectal abscess     Past Medical History:   Diagnosis Date    Diabetes (H)      Previous Genetic Testing  Essie has never had genetic testing.    Family History:   A standard three generation pedigree was obtained and is scanned into the medical record.  History pertinent to referral is underlined.  Children:   19 y/o son, history of acanthosis nigricans in childhood, but no formal diagnosis of diabetes  Siblings:   Full siblings: Sister with history of longstanding diagnosis of diabetes, though she is slightly overweight per [facility]  Paternal:   Paternal ancestry is of  descent.   Father:  at 42 y/o d/t complications of diabetes. Essie is not sure when her father was formally diagnosed with diabetes, but reports that he had significant complications including neuropathy. He was very thin per [facility]  Paternal grandfather:  in mid 60s, cause unknown. He was not reported to have diabetes  Paternal grandmother:  at 59 y/o, cause unknown. History of diabetes   Paternal aunts/uncles:   69 y/o aunt, who has diabetes and is in \"poor health\". Specific details regarding her diabetes diagnosis are not known  Uncle  at 70 y/o, cause unknown. History of diabetes  Uncle  at unknown age for unknown reasons. He had diabetes though limited details regarding his health are not known  Paternal cousins: Numerous, many of whom have diabetes and one who  d/t complications of diabetes at a young age  Maternal:   Maternal ancestry is of  descent.  Mother: 74 y/o, alive and well  Maternal grandfather: , cause unknown  Maternal grandmother: , cause unknown, she may have had diabetes which developed later in her life  Maternal aunts/uncles:   Aunt, healthy  Uncle, healthy  Maternal cousins: Numerous, healthy    There are no additional reports of family members with diabetes, renal disease including cysts, autism, developmental delay, intellectual " disability, seizures, significant hearing or vision loss, liver cancer, or history of genetic testing/concern for genetic condition. Consanguinity is denied.     Genetic Counseling Discussion:  Maturity-onset diabetes of the young (VARSHA) is a group of several conditions characterized by abnormally high blood sugar levels. These forms of diabetes typically begin before age 30, although they can occur later in life. In VARSHA, elevated blood sugar arises from reduced production of insulin, which is a hormone produced in the pancreas that helps regulate blood sugar levels. This form of diabetes has differences from type 1 and type 2 diabetes. Specifically, insulin controls how much glucose (a type of sugar) is passed from the blood into cells, where it is used as an energy source. VARSHA is estimated to account for 1 to 3 percent of all cases of diabetes.    The different types of VARSHA are distinguished by their genetic causes. The most common types are TGB6T-GXIQ (also known as MODY3), accounting for 50 to 70 percent of cases, and GCK-VARSHA (MODY2), accounting for 30 to 50 percent of cases. Less frequent types include PJP5R-OYDF (MODY1) and renal cysts and diabetes (RCAD) syndrome (also known as ZXI6P-ORXX or MODY5), which each account for 5 to 10 percent of cases. At least ten other types have been identified, and these are very rare.    VARSHA is generally inherited in an autosomal dominant pattern, which means one copy of the altered gene in each cell is sufficient to cause the disorder. It is possible for an individual to be the first person in their family to have VARSHA (de anya variants have been reported).    For more information, refer to: https://medlineplus.gov/genetics/condition/maturity-onset-diabetes-of-the-young/    At this time, we are unable to target genetic testing for a specific condition or gene for Essie.  In situations like this, we recommend examining numerous genes associated with the presenting  symptom.  For Essie, we are targeting genes associated with monogenic causes of diabetes. We discussed the details, limitations, and possible outcomes of next generation sequencing gene panels. There are three types of results:  Negative: meaning no pathogenic variants were identified in the genes that were tested  A negative result does not rule out the possibility that Essie's symptoms are due to a genetic etiology  Positive: meaning one (or more) pathogenic variants were identified in the genes that were tested that are associated with Essie's symptoms  We discussed that a positive result could provide an etiology to Essie's symptoms, give anticipatory guidance as to their potential progression, and clarify risks to family members.  We also discussed that a positive result could indicate that Essie is at risks for other health concerns, outside of their presenting symptoms  Uncertain: meaning one (or more) variants were identified in the genes that were tested, but there is not enough data to know if this variant is disease-causing; these are called variants of uncertain significance (VUS)  In most cases, identification of a VUS does not confirm a diagnosis and does not result in any clinically actionable recommendations.  The variant will be monitored over time to see if more information is known about it in the future.  If a VUS is identified, testing of other relatives may be helpful to provide clarification.    We discussed the potential benefits of genetic testing and why this genetic testing is medically indicated. A positive result will help determine the etiology of diabetes noted in Essie and will guide medical management for Essie.   For example, individuals with GCK VARSHA typically do not require insulin, but individuals with HNF1A (the other most common form of the condition), may require insulin  Low-dose oral sulfonylureas are the first line therapy for individuals with HNF1A and HNF4A VARSHA compared  to insulin  Individuals with KCNJ11 and ABCC8 variants can usually be exclusively treated with normal to high-dose oral sulfonylureas rather than insulin injections    If a genetic cause is found for Essie, it will give us a more accurate risk assessment for other family members.  Thus, the recommended testing for Essie  is DIAGNOSTIC testing, and it is NOT investigational.    Resources:  https://www.diabetesgenes.org/    References:  Macie R, Christal TAPIA, petrona HDZ. Maturity-Onset Diabetes of the Young Overview. 2018 May 24. In: Juan José MP, Libra GM, Sal BROWNING, et al., editors. [employer]  [Internet]. [place]): [facility], [place]; [id number]. Available from: https://www.ncbi.nlm.nih.gov/books/JYG852047/    Medical Management Recommendations:      Genes from GeneReviews:  GCK (30-50% of cases)  HNF1A (30-65% of cases)  HNF4A (5-10% of cases)  HNF1B (<5% of cases)  ABCC8***missing from MDL  APPL1  BLK  TAE***missing from [employer] panel  INS  KCNJ11  KLF11  NEUROD1  PAX4  PDX1    The potential results were discussed including a positive, negative, or variant of uncertain significance.    Necessity of Genetic Testing  Management and surveillance of Essie will depend on the genetic test results. It can also help predict the recurrence risk for Essie and other family members to have another child with similar healthcare needs.    ***Resources  https://journey.[email or web address][email or web address].org/  https://courageousparentsnetwork.org/  Primarily for terminal disorders, palliative care focused  There are oftentimes Facebook groups or other online support communities. While these can be beneficial, we encourage individuals to use skepticism and critical thinking. Online resources can sometimes be overwhelming. Online support resources should complement, rather than replace clinical information.     ***Gene List (for MDL orders):  ***Rationale why the test you are ordering is medically appropriate/actionable (for MDL  orders):       Please do not hesitate to contact me if you have any questions/concerns.     Sincerely,       Elinor Cabrera GC

## 2025-08-09 ENCOUNTER — LAB (OUTPATIENT)
Dept: LAB | Facility: CLINIC | Age: 50
End: 2025-08-09
Payer: COMMERCIAL

## 2025-08-09 DIAGNOSIS — Z13.71 ENCOUNTER FOR NONPROCREATIVE GENETIC COUNSELING AND TESTING: ICD-10-CM

## 2025-08-09 DIAGNOSIS — E11.9 DIABETES MELLITUS (H): ICD-10-CM

## 2025-08-09 DIAGNOSIS — Z83.3 FAMILY HISTORY OF DIABETES MELLITUS: ICD-10-CM

## 2025-08-09 DIAGNOSIS — Z71.83 ENCOUNTER FOR NONPROCREATIVE GENETIC COUNSELING AND TESTING: ICD-10-CM

## 2025-08-11 ENCOUNTER — LAB (OUTPATIENT)
Dept: LAB | Facility: CLINIC | Age: 50
End: 2025-08-11
Payer: COMMERCIAL

## 2025-08-11 DIAGNOSIS — Z13.71 ENCOUNTER FOR NONPROCREATIVE GENETIC COUNSELING AND TESTING: ICD-10-CM

## 2025-08-11 DIAGNOSIS — Z83.3 FAMILY HISTORY OF DIABETES MELLITUS: Primary | ICD-10-CM

## 2025-08-11 DIAGNOSIS — Z71.83 ENCOUNTER FOR NONPROCREATIVE GENETIC COUNSELING AND TESTING: ICD-10-CM

## 2025-08-12 LAB — INTERPRETATION SERPL IEP-IMP: NORMAL

## 2025-08-20 ENCOUNTER — TELEPHONE (OUTPATIENT)
Dept: CONSULT | Facility: CLINIC | Age: 50
End: 2025-08-20
Payer: COMMERCIAL

## (undated) DEVICE — TUBING SUCTION 12"X1/4" N612

## (undated) DEVICE — GLOVE BIOGEL PI MICRO SZ 7.0 48570

## (undated) DEVICE — PACK MINOR CUSTOM RIDGES SBA32RMRMA

## (undated) DEVICE — SU VICRYL 3-0 SH CR 8X18" J774

## (undated) DEVICE — SOL NACL 0.9% IRRIG 1000ML BOTTLE 2F7124

## (undated) DEVICE — LINEN TOWEL PACK X10 5473

## (undated) DEVICE — SUCTION CANISTER MEDIVAC LINER 3000ML W/LID 65651-530

## (undated) DEVICE — ESU ELEC NDL 1" COATED/INSULATED E1465

## (undated) DEVICE — GOWN XLG DISP 9545

## (undated) DEVICE — ESU GROUND PAD ADULT W/CORD E7507

## (undated) DEVICE — DRAPE LAP PEDS DISP 29492

## (undated) DEVICE — SUCTION MANIFOLD NEPTUNE 2 SYS 4 PORT 0702-020-000

## (undated) DEVICE — TRAY PREP DRY SKIN SCRUB 067

## (undated) DEVICE — LINEN FULL SHEET 5511

## (undated) DEVICE — TAPE DURAPORE 3" SILK 1538-3

## (undated) DEVICE — BAG CLEAR TRASH 1.3M 39X33" P4040C

## (undated) DEVICE — KIT PROCEDURE W/CLEAN-A-SCOPE LINERS V2 200800

## (undated) DEVICE — TUBING SUCTION MEDI-VAC SOFT 3/16"X20' N520A

## (undated) DEVICE — PANTIES MESH LG/XLG 2PK 706M2

## (undated) DEVICE — PAD CHUX UNDERPAD 30X36" P3036C

## (undated) DEVICE — SOL WATER IRRIG 1000ML BOTTLE 2F7114

## (undated) DEVICE — LINEN HALF SHEET 5512

## (undated) RX ORDER — LIDOCAINE HYDROCHLORIDE 10 MG/ML
INJECTION, SOLUTION EPIDURAL; INFILTRATION; INTRACAUDAL; PERINEURAL
Status: DISPENSED
Start: 2025-01-24

## (undated) RX ORDER — BUPIVACAINE HYDROCHLORIDE AND EPINEPHRINE 2.5; 5 MG/ML; UG/ML
INJECTION, SOLUTION EPIDURAL; INFILTRATION; INTRACAUDAL; PERINEURAL
Status: DISPENSED
Start: 2025-01-24